# Patient Record
Sex: FEMALE | URBAN - METROPOLITAN AREA
[De-identification: names, ages, dates, MRNs, and addresses within clinical notes are randomized per-mention and may not be internally consistent; named-entity substitution may affect disease eponyms.]

---

## 2019-02-26 ENCOUNTER — EMERGENCY (EMERGENCY)
Facility: HOSPITAL | Age: 54
LOS: 1 days | Discharge: HOME | End: 2019-02-26
Attending: EMERGENCY MEDICINE | Admitting: EMERGENCY MEDICINE
Payer: MEDICAID

## 2019-02-26 VITALS
HEART RATE: 92 BPM | RESPIRATION RATE: 18 BRPM | OXYGEN SATURATION: 99 % | SYSTOLIC BLOOD PRESSURE: 139 MMHG | DIASTOLIC BLOOD PRESSURE: 94 MMHG | TEMPERATURE: 98 F

## 2019-02-26 LAB
ALBUMIN SERPL ELPH-MCNC: 4.7 G/DL — SIGNIFICANT CHANGE UP (ref 3.3–5)
ALP SERPL-CCNC: 43 U/L — SIGNIFICANT CHANGE UP (ref 40–120)
ALT FLD-CCNC: 32 U/L — SIGNIFICANT CHANGE UP (ref 4–33)
ANION GAP SERPL CALC-SCNC: 13 MMO/L — SIGNIFICANT CHANGE UP (ref 7–14)
AST SERPL-CCNC: 17 U/L — SIGNIFICANT CHANGE UP (ref 4–32)
BASOPHILS # BLD AUTO: 0.03 K/UL — SIGNIFICANT CHANGE UP (ref 0–0.2)
BASOPHILS NFR BLD AUTO: 0.6 % — SIGNIFICANT CHANGE UP (ref 0–2)
BILIRUB SERPL-MCNC: 0.3 MG/DL — SIGNIFICANT CHANGE UP (ref 0.2–1.2)
BUN SERPL-MCNC: 11 MG/DL — SIGNIFICANT CHANGE UP (ref 7–23)
CALCIUM SERPL-MCNC: 9.7 MG/DL — SIGNIFICANT CHANGE UP (ref 8.4–10.5)
CHLORIDE SERPL-SCNC: 102 MMOL/L — SIGNIFICANT CHANGE UP (ref 98–107)
CO2 SERPL-SCNC: 25 MMOL/L — SIGNIFICANT CHANGE UP (ref 22–31)
CREAT SERPL-MCNC: 0.52 MG/DL — SIGNIFICANT CHANGE UP (ref 0.5–1.3)
EOSINOPHIL # BLD AUTO: 0.04 K/UL — SIGNIFICANT CHANGE UP (ref 0–0.5)
EOSINOPHIL NFR BLD AUTO: 0.8 % — SIGNIFICANT CHANGE UP (ref 0–6)
GLUCOSE SERPL-MCNC: 223 MG/DL — HIGH (ref 70–99)
HBA1C BLD-MCNC: 7 % — HIGH (ref 4–5.6)
HCT VFR BLD CALC: 40.9 % — SIGNIFICANT CHANGE UP (ref 34.5–45)
HGB BLD-MCNC: 13.1 G/DL — SIGNIFICANT CHANGE UP (ref 11.5–15.5)
IMM GRANULOCYTES NFR BLD AUTO: 0.4 % — SIGNIFICANT CHANGE UP (ref 0–1.5)
LYMPHOCYTES # BLD AUTO: 1.87 K/UL — SIGNIFICANT CHANGE UP (ref 1–3.3)
LYMPHOCYTES # BLD AUTO: 35.3 % — SIGNIFICANT CHANGE UP (ref 13–44)
MAGNESIUM SERPL-MCNC: 1.9 MG/DL — SIGNIFICANT CHANGE UP (ref 1.6–2.6)
MCHC RBC-ENTMCNC: 27.8 PG — SIGNIFICANT CHANGE UP (ref 27–34)
MCHC RBC-ENTMCNC: 32 % — SIGNIFICANT CHANGE UP (ref 32–36)
MCV RBC AUTO: 86.7 FL — SIGNIFICANT CHANGE UP (ref 80–100)
MONOCYTES # BLD AUTO: 0.25 K/UL — SIGNIFICANT CHANGE UP (ref 0–0.9)
MONOCYTES NFR BLD AUTO: 4.7 % — SIGNIFICANT CHANGE UP (ref 2–14)
NEUTROPHILS # BLD AUTO: 3.08 K/UL — SIGNIFICANT CHANGE UP (ref 1.8–7.4)
NEUTROPHILS NFR BLD AUTO: 58.2 % — SIGNIFICANT CHANGE UP (ref 43–77)
NRBC # FLD: 0 K/UL — LOW (ref 25–125)
PHOSPHATE SERPL-MCNC: 2.8 MG/DL — SIGNIFICANT CHANGE UP (ref 2.5–4.5)
PLATELET # BLD AUTO: 118 K/UL — LOW (ref 150–400)
PMV BLD: SIGNIFICANT CHANGE UP FL (ref 7–13)
POTASSIUM SERPL-MCNC: 4.1 MMOL/L — SIGNIFICANT CHANGE UP (ref 3.5–5.3)
POTASSIUM SERPL-SCNC: 4.1 MMOL/L — SIGNIFICANT CHANGE UP (ref 3.5–5.3)
PROT SERPL-MCNC: 7.1 G/DL — SIGNIFICANT CHANGE UP (ref 6–8.3)
RBC # BLD: 4.72 M/UL — SIGNIFICANT CHANGE UP (ref 3.8–5.2)
RBC # FLD: 13.3 % — SIGNIFICANT CHANGE UP (ref 10.3–14.5)
SODIUM SERPL-SCNC: 140 MMOL/L — SIGNIFICANT CHANGE UP (ref 135–145)
WBC # BLD: 5.29 K/UL — SIGNIFICANT CHANGE UP (ref 3.8–10.5)
WBC # FLD AUTO: 5.29 K/UL — SIGNIFICANT CHANGE UP (ref 3.8–10.5)

## 2019-02-26 PROCEDURE — 99283 EMERGENCY DEPT VISIT LOW MDM: CPT

## 2019-02-26 RX ORDER — SODIUM CHLORIDE 9 MG/ML
1000 INJECTION INTRAMUSCULAR; INTRAVENOUS; SUBCUTANEOUS ONCE
Qty: 0 | Refills: 0 | Status: COMPLETED | OUTPATIENT
Start: 2019-02-26 | End: 2019-02-26

## 2019-02-26 RX ADMIN — SODIUM CHLORIDE 1000 MILLILITER(S): 9 INJECTION INTRAMUSCULAR; INTRAVENOUS; SUBCUTANEOUS at 11:27

## 2019-02-26 NOTE — ED PROVIDER NOTE - CRANIAL NERVE AND PUPILLARY EXAM
tongue is midline/central and peripheral vision intact/cranial nerves 2-12 intact/extra-ocular movements intact

## 2019-02-26 NOTE — ED ADULT NURSE NOTE - NSIMPLEMENTINTERV_GEN_ALL_ED
Implemented All Universal Safety Interventions:  [place] to call system. Call bell, personal items and telephone within reach. Instruct patient to call for assistance. Room bathroom lighting operational. Non-slip footwear when patient is off stretcher. Physically safe environment: no spills, clutter or unnecessary equipment. Stretcher in lowest position, wheels locked, appropriate side rails in place.

## 2019-02-26 NOTE — ED ADULT NURSE NOTE - OBJECTIVE STATEMENT
received pt to INT12, Patient has c/o being dizzy and feeling like she is having spasms in her head x 1week, pt A&Ox4, VSS, denies CP/SOB/N, IV access rt ac 20g, labs sent, 1L NS bolus initiated.

## 2019-02-26 NOTE — ED PROVIDER NOTE - NSFOLLOWUPINSTRUCTIONS_ED_ALL_ED_FT
- Follow up with your doctor within 2-3 days.   - Bring results with you to the appointment.   - Return to the ED for any new or worsening symptoms.

## 2019-02-26 NOTE — ED PROVIDER NOTE - ATTENDING CONTRIBUTION TO CARE
Attending Attestation: Dr. Villagran  I have personally performed a history and physical examination of the patient and discussed management with the resident as well as the patient.  I reviewed the resident's note and agree with the documented findings and plan of care.  I have authored and modified critical sections of the Provider Note, including but not limited to HPI, Physical Exam and MDM. 53 yr old F p/w paresthesias, poorly controlled blood glucose.  NO pain, neuro exam benign with no focal findings.  No skin changes/rash. Suspect hyperglycemic related sx.  Will send labs to assess eelctorlyte abn. Also, A1C.

## 2019-02-26 NOTE — ED PROVIDER NOTE - OBJECTIVE STATEMENT
53F h/o HTN, HLD, DM p/w waxing and waning numbness feeling radiating from forehead over her head x 1.5 weeks, worse x 3 days, sometimes a/w head pain, currently pain free. Saw PMD 4 days ago, told to stop taking additional supplements she recently started taking. Believes symptoms are worse when her sugar is high or low. Denies fever, travel, vision change, nausea, vomiting, photophobia. 53F h/o HTN, HLD, DM p/w waxing and waning "numbness" (tingling?) feeling radiating from forehead over her head x 1.5 weeks, worse x 3 days, sometimes a/w head pain, currently pain free. Saw PMD 4 days ago, told to stop taking additional supplements she recently started taking. Believes symptoms are worse when her sugar is high or low. Denies fever, travel, vision change, nausea, vomiting, photophobia.  No recent change to her chronic meds. States that recently her blood glucose has been poorly controlled - koo snot know why.  Once her sx started she was told to decrease her metformin from 100 bid to 500 bid.

## 2019-02-26 NOTE — ED PROVIDER NOTE - CLINICAL SUMMARY MEDICAL DECISION MAKING FREE TEXT BOX
53 yr old F p/w paresthesias, poorly controlled blood glucose.  NO pain, neuro exam benign with no focal findings.  No skin changes/rash. Suspect hyperglycemic related sx.  Will send labs to assess eelctorlyte abn. Also, A1C.

## 2021-04-13 PROBLEM — E11.9 TYPE 2 DIABETES MELLITUS WITHOUT COMPLICATIONS: Chronic | Status: ACTIVE | Noted: 2019-02-26

## 2021-04-13 PROBLEM — E78.5 HYPERLIPIDEMIA, UNSPECIFIED: Chronic | Status: ACTIVE | Noted: 2019-02-26

## 2021-04-13 PROBLEM — I10 ESSENTIAL (PRIMARY) HYPERTENSION: Chronic | Status: ACTIVE | Noted: 2019-02-26

## 2021-05-12 ENCOUNTER — APPOINTMENT (OUTPATIENT)
Dept: VASCULAR SURGERY | Facility: CLINIC | Age: 56
End: 2021-05-12

## 2021-09-01 ENCOUNTER — OUTPATIENT (OUTPATIENT)
Dept: OUTPATIENT SERVICES | Facility: HOSPITAL | Age: 56
LOS: 1 days | End: 2021-09-01
Payer: MEDICAID

## 2021-09-01 PROCEDURE — G9005: CPT

## 2021-09-21 ENCOUNTER — EMERGENCY (EMERGENCY)
Facility: HOSPITAL | Age: 56
LOS: 1 days | Discharge: HOME | End: 2021-09-21
Attending: STUDENT IN AN ORGANIZED HEALTH CARE EDUCATION/TRAINING PROGRAM | Admitting: STUDENT IN AN ORGANIZED HEALTH CARE EDUCATION/TRAINING PROGRAM

## 2021-09-21 VITALS
OXYGEN SATURATION: 100 % | SYSTOLIC BLOOD PRESSURE: 158 MMHG | DIASTOLIC BLOOD PRESSURE: 90 MMHG | HEART RATE: 117 BPM | TEMPERATURE: 98 F | RESPIRATION RATE: 18 BRPM

## 2021-09-21 VITALS
HEART RATE: 105 BPM | OXYGEN SATURATION: 100 % | RESPIRATION RATE: 20 BRPM | SYSTOLIC BLOOD PRESSURE: 135 MMHG | DIASTOLIC BLOOD PRESSURE: 76 MMHG

## 2021-09-21 RX ORDER — LIDOCAINE 4 G/100G
1 CREAM TOPICAL ONCE
Refills: 0 | Status: COMPLETED | OUTPATIENT
Start: 2021-09-21 | End: 2021-09-21

## 2021-09-21 RX ORDER — ACETAMINOPHEN 500 MG
650 TABLET ORAL ONCE
Refills: 0 | Status: COMPLETED | OUTPATIENT
Start: 2021-09-21 | End: 2021-09-21

## 2021-09-21 RX ORDER — BUPIVACAINE HCL/PF 7.5 MG/ML
10 VIAL (ML) INJECTION ONCE
Refills: 0 | Status: DISCONTINUED | OUTPATIENT
Start: 2021-09-21 | End: 2021-09-25

## 2021-09-21 RX ORDER — DEXAMETHASONE 0.5 MG/5ML
12 ELIXIR ORAL ONCE
Refills: 0 | Status: COMPLETED | OUTPATIENT
Start: 2021-09-21 | End: 2021-09-21

## 2021-09-21 RX ORDER — IBUPROFEN 200 MG
400 TABLET ORAL ONCE
Refills: 0 | Status: COMPLETED | OUTPATIENT
Start: 2021-09-21 | End: 2021-09-21

## 2021-09-21 RX ORDER — CYCLOBENZAPRINE HYDROCHLORIDE 10 MG/1
1 TABLET, FILM COATED ORAL
Qty: 15 | Refills: 0
Start: 2021-09-21 | End: 2021-09-25

## 2021-09-21 RX ORDER — DIAZEPAM 5 MG
5 TABLET ORAL ONCE
Refills: 0 | Status: DISCONTINUED | OUTPATIENT
Start: 2021-09-21 | End: 2021-09-21

## 2021-09-21 RX ADMIN — Medication 400 MILLIGRAM(S): at 13:43

## 2021-09-21 RX ADMIN — Medication 5 MILLIGRAM(S): at 13:43

## 2021-09-21 RX ADMIN — LIDOCAINE 1 PATCH: 4 CREAM TOPICAL at 13:43

## 2021-09-21 RX ADMIN — Medication 12 MILLIGRAM(S): at 15:11

## 2021-09-21 RX ADMIN — LIDOCAINE 1 PATCH: 4 CREAM TOPICAL at 13:57

## 2021-09-21 NOTE — ED PROVIDER NOTE - ATTENDING CONTRIBUTION TO CARE
I (Portia) agree with above, I performed a history and physical. Counseled [facility] medical staff, physician assistant, and/or medical student on medical decision making as documented. Medical decisions and treatment interventions were made in real time during the patient encounter. Additionally and/or with the following exceptions: the patient presented with lower back pain, radiating to right leg, has had an mri which was positive for herniated disc, no red flags ||| vital signs normal and stable during my period of care ||| neuro intact, ambulatory initially with antalgic gait, after  trigger point injections, pain dramatically improved, return precautions, ortho spine follow up

## 2021-09-21 NOTE — ED PROCEDURE NOTE - PROCEDURE ADDITIONAL DETAILS
The patient presented with trigger points in the right paraspinal lumbar muscle groups.  Injections of lidocaine 0.25 bupivicaine were injected into 3 area areas of each left and right side paraspinal muscles.  The patient tolerated the procedure well, reporting improvement of the pain. CPT=48344

## 2021-09-21 NOTE — ED ADULT NURSE NOTE - CHIEF COMPLAINT QUOTE
Pt reporting to ED for R lower back pain radiating to right calf starting 1 week ago. Pt ambulatorily with steady gait in triage. tachycardic on assessment. Denies chest pain ot SOB, dysuria, recent injury. awaiting EKG.

## 2021-09-21 NOTE — ED ADULT TRIAGE NOTE - CHIEF COMPLAINT QUOTE
Pt reporting to ED for R lower back pain radiating to right calf starting 1 week ago. Pt ambulatorily with steady gait in triage. tachycardic on assessment. Denies chest pain ot SOB. awaiting EKG. Pt reporting to ED for R lower back pain radiating to right calf starting 1 week ago. Pt ambulatorily with steady gait in triage. tachycardic on assessment. Denies chest pain ot SOB, dysuria, recent injury. awaiting EKG.

## 2021-09-21 NOTE — ED PROVIDER NOTE - CLINICAL SUMMARY MEDICAL DECISION MAKING FREE TEXT BOX
this is a 55 y.o female with a pMhx of DM presented to the ED complaining of having right sided lower back pain for the past several months but worsening over the past few days, back pain-meds, tigger point injection reassess.

## 2021-09-21 NOTE — ED PROVIDER NOTE - NS CPE EDP MUSC LUMBAR LOC
tenderness in the lumbar region, neurovascular intact. 5/5 strength. ambulating with steady gait./tenderness

## 2021-09-21 NOTE — ED PROVIDER NOTE - OBJECTIVE STATEMENT
this is a 55 y.o female with a pMhx of DM presented to the ED complaining of having right sided lower back pain for the past several months but worsening over the past few days, patient states that the pain starts in the right lower back region and radiates down, she gets a burning in her calf region as well, patient states that she has a MRI previous which showed that she had a bulging disc. patient admits to having a burning sensation started in the lower back region and radiates down. Patient denies having any recent travel, OCP usage, or h/o blood clots. pt denies having any CP, SOB, ESPINOZA, headaches, dizziness, fever, chills, bodyaches, saddle parasthesias, or bowel or bladder incontinence.

## 2021-09-21 NOTE — ED PROVIDER NOTE - NSFOLLOWUPINSTRUCTIONS_ED_ALL_ED_FT
Rest, drink plenty of fluids.  Advance activity as tolerated.  Continue all previously prescribed medications as directed.  Follow up with your primary care physician in 48-72 hours- bring copies of your results.  Return to the ER for worsening or persistent symptoms, and/or ANY NEW OR CONCERNING SYMPTOMS. If you have issues obtaining follow up, please call: [phone] to obtain a doctor or specialist who takes your insurance in your area.  You may call [phone] to make an appointment with the internal medicine clinic.    Please follow up with your PMD.

## 2021-09-21 NOTE — ED PROVIDER NOTE - PATIENT PORTAL LINK FT
You can access the FollowMyHealth Patient Portal offered by [facility] by registering at the following website: [email or web address]. By joining [employer]’s FollowMyHealth portal, you will also be able to view your health information using other applications (apps) compatible with our system.

## 2021-09-21 NOTE — ED ADULT NURSE NOTE - OBJECTIVE STATEMENT
Break RN: pt received to intake rm 9 c/o lower right back pain radiating down to R calf. AxOx4 and ambulatory at baseline. Pt appears uncomfortable, in NAD, respirations even/unlabored. PT denies any recent injury/fall. Pt medicated as per orders, will continue to monitor.

## 2021-09-22 ENCOUNTER — EMERGENCY (EMERGENCY)
Facility: HOSPITAL | Age: 56
LOS: 1 days | Discharge: HOME | End: 2021-09-22
Attending: EMERGENCY MEDICINE | Admitting: EMERGENCY MEDICINE
Payer: MEDICAID

## 2021-09-22 VITALS
TEMPERATURE: 98 F | HEART RATE: 100 BPM | DIASTOLIC BLOOD PRESSURE: 62 MMHG | OXYGEN SATURATION: 99 % | SYSTOLIC BLOOD PRESSURE: 140 MMHG | RESPIRATION RATE: 18 BRPM

## 2021-09-22 LAB
ALBUMIN SERPL ELPH-MCNC: 5.6 G/DL — HIGH (ref 3.3–5)
ALP SERPL-CCNC: 63 U/L — SIGNIFICANT CHANGE UP (ref 40–120)
ALT FLD-CCNC: 49 U/L — HIGH (ref 4–33)
ANION GAP SERPL CALC-SCNC: 17 MMOL/L — HIGH (ref 7–14)
AST SERPL-CCNC: 20 U/L — SIGNIFICANT CHANGE UP (ref 4–32)
B PERT DNA SPEC QL NAA+PROBE: SIGNIFICANT CHANGE UP
B PERT+PARAPERT DNA PNL SPEC NAA+PROBE: SIGNIFICANT CHANGE UP
BILIRUB SERPL-MCNC: 0.3 MG/DL — SIGNIFICANT CHANGE UP (ref 0.2–1.2)
BORDETELLA PARAPERTUSSIS (RAPRVP): SIGNIFICANT CHANGE UP
BUN SERPL-MCNC: 14 MG/DL — SIGNIFICANT CHANGE UP (ref 7–23)
C PNEUM DNA SPEC QL NAA+PROBE: SIGNIFICANT CHANGE UP
CALCIUM SERPL-MCNC: 10.4 MG/DL — SIGNIFICANT CHANGE UP (ref 8.4–10.5)
CHLORIDE SERPL-SCNC: 99 MMOL/L — SIGNIFICANT CHANGE UP (ref 98–107)
CO2 SERPL-SCNC: 21 MMOL/L — LOW (ref 22–31)
CREAT SERPL-MCNC: 0.44 MG/DL — LOW (ref 0.5–1.3)
FLUAV SUBTYP SPEC NAA+PROBE: SIGNIFICANT CHANGE UP
FLUBV RNA SPEC QL NAA+PROBE: SIGNIFICANT CHANGE UP
GLUCOSE SERPL-MCNC: 206 MG/DL — HIGH (ref 70–99)
HADV DNA SPEC QL NAA+PROBE: SIGNIFICANT CHANGE UP
HCOV 229E RNA SPEC QL NAA+PROBE: SIGNIFICANT CHANGE UP
HCOV HKU1 RNA SPEC QL NAA+PROBE: SIGNIFICANT CHANGE UP
HCOV NL63 RNA SPEC QL NAA+PROBE: SIGNIFICANT CHANGE UP
HCOV OC43 RNA SPEC QL NAA+PROBE: SIGNIFICANT CHANGE UP
HCT VFR BLD CALC: 40.6 % — SIGNIFICANT CHANGE UP (ref 34.5–45)
HGB BLD-MCNC: 13.2 G/DL — SIGNIFICANT CHANGE UP (ref 11.5–15.5)
HMPV RNA SPEC QL NAA+PROBE: SIGNIFICANT CHANGE UP
HPIV1 RNA SPEC QL NAA+PROBE: SIGNIFICANT CHANGE UP
HPIV2 RNA SPEC QL NAA+PROBE: SIGNIFICANT CHANGE UP
HPIV3 RNA SPEC QL NAA+PROBE: SIGNIFICANT CHANGE UP
HPIV4 RNA SPEC QL NAA+PROBE: SIGNIFICANT CHANGE UP
M PNEUMO DNA SPEC QL NAA+PROBE: SIGNIFICANT CHANGE UP
MCHC RBC-ENTMCNC: 27.3 PG — SIGNIFICANT CHANGE UP (ref 27–34)
MCHC RBC-ENTMCNC: 32.5 GM/DL — SIGNIFICANT CHANGE UP (ref 32–36)
MCV RBC AUTO: 84.1 FL — SIGNIFICANT CHANGE UP (ref 80–100)
NRBC # BLD: 0 /100 WBCS — SIGNIFICANT CHANGE UP
NRBC # FLD: 0.02 K/UL — HIGH
PLATELET # BLD AUTO: 148 K/UL — LOW (ref 150–400)
POTASSIUM SERPL-MCNC: 3.9 MMOL/L — SIGNIFICANT CHANGE UP (ref 3.5–5.3)
POTASSIUM SERPL-SCNC: 3.9 MMOL/L — SIGNIFICANT CHANGE UP (ref 3.5–5.3)
PROT SERPL-MCNC: 8.4 G/DL — HIGH (ref 6–8.3)
RAPID RVP RESULT: SIGNIFICANT CHANGE UP
RBC # BLD: 4.83 M/UL — SIGNIFICANT CHANGE UP (ref 3.8–5.2)
RBC # FLD: 13.9 % — SIGNIFICANT CHANGE UP (ref 10.3–14.5)
RSV RNA SPEC QL NAA+PROBE: SIGNIFICANT CHANGE UP
RV+EV RNA SPEC QL NAA+PROBE: SIGNIFICANT CHANGE UP
SARS-COV-2 RNA SPEC QL NAA+PROBE: SIGNIFICANT CHANGE UP
SODIUM SERPL-SCNC: 137 MMOL/L — SIGNIFICANT CHANGE UP (ref 135–145)
WBC # BLD: 9.58 K/UL — SIGNIFICANT CHANGE UP (ref 3.8–10.5)
WBC # FLD AUTO: 9.58 K/UL — SIGNIFICANT CHANGE UP (ref 3.8–10.5)

## 2021-09-22 PROCEDURE — 72100 X-RAY EXAM L-S SPINE 2/3 VWS: CPT | Mod: 26

## 2021-09-22 PROCEDURE — 93971 EXTREMITY STUDY: CPT | Mod: 26,LT

## 2021-09-22 PROCEDURE — 99218: CPT

## 2021-09-22 RX ORDER — MORPHINE SULFATE 50 MG/1
4 CAPSULE, EXTENDED RELEASE ORAL ONCE
Refills: 0 | Status: DISCONTINUED | OUTPATIENT
Start: 2021-09-22 | End: 2021-09-22

## 2021-09-22 RX ORDER — KETOROLAC TROMETHAMINE 30 MG/ML
30 SYRINGE (ML) INJECTION EVERY 6 HOURS
Refills: 0 | Status: COMPLETED | OUTPATIENT
Start: 2021-09-22 | End: 2021-09-27

## 2021-09-22 RX ORDER — INSULIN LISPRO 100/ML
VIAL (ML) SUBCUTANEOUS
Refills: 0 | Status: DISCONTINUED | OUTPATIENT
Start: 2021-09-22 | End: 2021-09-25

## 2021-09-22 RX ORDER — DEXTROSE 50 % IN WATER 50 %
12.5 SYRINGE (ML) INTRAVENOUS ONCE
Refills: 0 | Status: DISCONTINUED | OUTPATIENT
Start: 2021-09-22 | End: 2021-09-25

## 2021-09-22 RX ORDER — OXYCODONE HYDROCHLORIDE 5 MG/1
5 TABLET ORAL ONCE
Refills: 0 | Status: DISCONTINUED | OUTPATIENT
Start: 2021-09-22 | End: 2021-09-22

## 2021-09-22 RX ORDER — DEXTROSE 50 % IN WATER 50 %
15 SYRINGE (ML) INTRAVENOUS ONCE
Refills: 0 | Status: DISCONTINUED | OUTPATIENT
Start: 2021-09-22 | End: 2021-09-25

## 2021-09-22 RX ORDER — SODIUM CHLORIDE 9 MG/ML
1000 INJECTION, SOLUTION INTRAVENOUS
Refills: 0 | Status: DISCONTINUED | OUTPATIENT
Start: 2021-09-22 | End: 2021-09-25

## 2021-09-22 RX ORDER — ACETAMINOPHEN 500 MG
975 TABLET ORAL ONCE
Refills: 0 | Status: DISCONTINUED | OUTPATIENT
Start: 2021-09-22 | End: 2021-09-22

## 2021-09-22 RX ORDER — METFORMIN HYDROCHLORIDE 850 MG/1
1000 TABLET ORAL
Refills: 0 | Status: DISCONTINUED | OUTPATIENT
Start: 2021-09-22 | End: 2021-09-23

## 2021-09-22 RX ORDER — LISINOPRIL 2.5 MG/1
20 TABLET ORAL DAILY
Refills: 0 | Status: DISCONTINUED | OUTPATIENT
Start: 2021-09-23 | End: 2021-09-25

## 2021-09-22 RX ORDER — GLUCAGON INJECTION, SOLUTION 0.5 MG/.1ML
1 INJECTION, SOLUTION SUBCUTANEOUS ONCE
Refills: 0 | Status: DISCONTINUED | OUTPATIENT
Start: 2021-09-22 | End: 2021-09-25

## 2021-09-22 RX ORDER — GABAPENTIN 400 MG/1
300 CAPSULE ORAL ONCE
Refills: 0 | Status: COMPLETED | OUTPATIENT
Start: 2021-09-22 | End: 2021-09-22

## 2021-09-22 RX ORDER — CYCLOBENZAPRINE HYDROCHLORIDE 10 MG/1
5 TABLET, FILM COATED ORAL ONCE
Refills: 0 | Status: COMPLETED | OUTPATIENT
Start: 2021-09-22 | End: 2021-09-22

## 2021-09-22 RX ORDER — ACETAMINOPHEN 500 MG
650 TABLET ORAL ONCE
Refills: 0 | Status: COMPLETED | OUTPATIENT
Start: 2021-09-22 | End: 2021-09-22

## 2021-09-22 RX ORDER — OXYCODONE AND ACETAMINOPHEN 5; 325 MG/1; MG/1
1 TABLET ORAL ONCE
Refills: 0 | Status: DISCONTINUED | OUTPATIENT
Start: 2021-09-22 | End: 2021-09-22

## 2021-09-22 RX ORDER — ATORVASTATIN CALCIUM 80 MG/1
10 TABLET, FILM COATED ORAL AT BEDTIME
Refills: 0 | Status: DISCONTINUED | OUTPATIENT
Start: 2021-09-22 | End: 2021-09-25

## 2021-09-22 RX ORDER — DEXTROSE 50 % IN WATER 50 %
25 SYRINGE (ML) INTRAVENOUS ONCE
Refills: 0 | Status: DISCONTINUED | OUTPATIENT
Start: 2021-09-22 | End: 2021-09-25

## 2021-09-22 RX ORDER — ACETAMINOPHEN 500 MG
650 TABLET ORAL EVERY 6 HOURS
Refills: 0 | Status: DISCONTINUED | OUTPATIENT
Start: 2021-09-22 | End: 2021-09-25

## 2021-09-22 RX ORDER — DIAZEPAM 5 MG
5 TABLET ORAL EVERY 8 HOURS
Refills: 0 | Status: COMPLETED | OUTPATIENT
Start: 2021-09-22 | End: 2021-09-29

## 2021-09-22 RX ORDER — LIDOCAINE 4 G/100G
1 CREAM TOPICAL DAILY
Refills: 0 | Status: DISCONTINUED | OUTPATIENT
Start: 2021-09-22 | End: 2021-09-25

## 2021-09-22 RX ADMIN — OXYCODONE AND ACETAMINOPHEN 1 TABLET(S): 5; 325 TABLET ORAL at 12:53

## 2021-09-22 RX ADMIN — Medication 650 MILLIGRAM(S): at 17:48

## 2021-09-22 RX ADMIN — Medication 5 MILLIGRAM(S): at 15:35

## 2021-09-22 RX ADMIN — OXYCODONE AND ACETAMINOPHEN 1 TABLET(S): 5; 325 TABLET ORAL at 09:34

## 2021-09-22 RX ADMIN — METFORMIN HYDROCHLORIDE 1000 MILLIGRAM(S): 850 TABLET ORAL at 17:49

## 2021-09-22 RX ADMIN — Medication 500 MILLIGRAM(S): at 09:26

## 2021-09-22 RX ADMIN — GABAPENTIN 300 MILLIGRAM(S): 400 CAPSULE ORAL at 09:31

## 2021-09-22 RX ADMIN — Medication 60 MILLIGRAM(S): at 09:35

## 2021-09-22 RX ADMIN — MORPHINE SULFATE 4 MILLIGRAM(S): 50 CAPSULE, EXTENDED RELEASE ORAL at 12:58

## 2021-09-22 RX ADMIN — OXYCODONE HYDROCHLORIDE 5 MILLIGRAM(S): 5 TABLET ORAL at 21:14

## 2021-09-22 RX ADMIN — ATORVASTATIN CALCIUM 10 MILLIGRAM(S): 80 TABLET, FILM COATED ORAL at 21:14

## 2021-09-22 RX ADMIN — Medication 650 MILLIGRAM(S): at 12:53

## 2021-09-22 RX ADMIN — Medication 650 MILLIGRAM(S): at 09:35

## 2021-09-22 RX ADMIN — Medication 500 MILLIGRAM(S): at 12:53

## 2021-09-22 RX ADMIN — CYCLOBENZAPRINE HYDROCHLORIDE 5 MILLIGRAM(S): 10 TABLET, FILM COATED ORAL at 09:26

## 2021-09-22 RX ADMIN — Medication 30 MILLIGRAM(S): at 17:48

## 2021-09-22 NOTE — ED CDU PROVIDER INITIAL DAY NOTE - ATTENDING CONTRIBUTION TO CARE
I performed a face-to-face evaluation of the patient and performed a history and physical examination. I agree with the history and physical examination.    Andreea: Sciatica manifesting as posterior calf pain. No neuro deficits or bowel/bladder incontinence. No cancer/fever/IVDA/trauma. Therefore, not likely epidural abscess, traumatic fracture, osteomyelitis, or spinal cord compression. Failed PO and IV pain meds. CDU for pain control. Pt. not interested in surgery; therefore, no likely benefit of MRI. May give prednisone taper: 60 mg for 2 more days, 40 mg for 3 days, and 20 mg for 3 days (https://pubmed.ncbi.nlm.nih.gov/62492012/). F/u [facility] [place] when her [employer] insurance re-starts 10/1/21.

## 2021-09-22 NOTE — ED ADULT TRIAGE NOTE - CHIEF COMPLAINT QUOTE
p/t c/o of rt hip pain radiating to rt calf for few days, p/t seen in ED yesterday for same issue, p/t ambulatory

## 2021-09-22 NOTE — ED CDU PROVIDER INITIAL DAY NOTE - OBJECTIVE STATEMENT
Andreea: H/o back pain; MRI w/ disc bulge last year. Improved w/ Francine Natarajan Neurologist-recommended PT. Yesterday, had lumbar pain; here, got trigger point injections. Today, burning pain R calf (not numb, weak), 9/10. No weakness/numbness. No neuro deficits or bowel/bladder incontinence. No cancer/fever/IVDA/trauma. Therefore, not likely epidural abscess, traumatic fracture, osteomyelitis, or spinal cord compression. Didn't take her yesterday-precribed Flexeril or Naproxen today. Pt with a H/o back pain, DM, HTN, HLD MRI w/ disc bulge last year. Improved w/ Francine Natarajan Neurologist-recommended PT. Yesterday, had lumbar pain; here, got trigger point injections. Today, burning pain R calf (not numb, weak), 9/10. No weakness/numbness. No neuro deficits or bowel/bladder incontinence. No cancer/fever/IVDA/trauma. Therefore, not likely epidural abscess, traumatic fracture, osteomyelitis, or spinal cord compression. Didn't take her yesterday-prescribed Flexeril or Naproxen today. Pt states that her pain is currently 8/10. Pt was sent to CDU for pain control and reassessment. Pt is well appearing, no acute distress.

## 2021-09-22 NOTE — ED PROVIDER NOTE - OBJECTIVE STATEMENT
Andreea: H/o back pain; MRI w/ disc bulge. Yesterday, had lumbar pain; here, got trigger point injections. Today, burning pain R calf (not numb, weak), 9/10. No weakness/numbness. No neuro deficits or bowel/bladder incontinence. No cancer/fever/IVDA/trauma. Therefore, not likely epidural abscess, traumatic fracture, osteomyelitis, or spinal cord compression. Didn't take her yesterday-precribed Flexeril or Naproxen today.

## 2021-09-22 NOTE — ED CDU PROVIDER INITIAL DAY NOTE - PHYSICAL EXAMINATION
Well appearing, well nourished, awake, alert, oriented to person, place, time/situation and in no apparent distress.    Airway patent    Eyes without scleral injection. No jaundice.    Strong pulse.    Respirations unlabored.    Abdomen soft, non-tender, no guarding.    Spine appears normal, range of motion is not limited, no muscle or joint tenderness. R calf not red/warm/tender/swollen.    Alert and oriented, no gross motor or sensory deficits.    Skin normal color for race, warm, dry and intact. No evidence of rash.    No SI/HI.

## 2021-09-22 NOTE — ED PROVIDER NOTE - PROGRESS NOTE DETAILS
Andreea: No improvement after PO meds. Check labs, xray, US for DVT. Possible CDU for MRI/pain meds. Andreea: Lab results unremarkable. Radiology results unremarkable. Pt. not feel well enough to go home. CDU for pain control.

## 2021-09-22 NOTE — ED CDU PROVIDER INITIAL DAY NOTE - MEDICAL DECISION MAKING DETAILS
Andreea: Sciatica manifesting as posterior calf pain. No neuro deficits or bowel/bladder incontinence. No cancer/fever/IVDA/trauma. Therefore, not likely epidural abscess, traumatic fracture, osteomyelitis, or spinal cord compression. Failed PO and IV pain meds. CDU for pain control. Pt. not interested in surgery; therefore, no likely benefit of MRI. May give prednisone taper: 60 mg for 2 more days, 40 mg for 3 days, and 20 mg for 3 days (https://pubmed.ncbi.nlm.nih.gov/68665979/). F/u [facility] when her [employer] insurance re-starts 10/1/21.

## 2021-09-22 NOTE — ED PROVIDER NOTE - NSFOLLOWUPINSTRUCTIONS_ED_ALL_ED_FT
Take medications as prescribed.     If not improved in 2 weeks, follow with [facility] [place] (comprehensive, with Neurology, Orthopedic Surgery, Neurosurgery, and Physical Therapy): [phone].

## 2021-09-22 NOTE — ED ADULT NURSE NOTE - OBJECTIVE STATEMENT
p/t c/o of rt hip pain radiating to rt calf x 3days, p/t seen in ED yesterday for same issue, p/t ambulatory. Medicated as per order, instructed patient to use call bell after pain meds, call bell in reach.

## 2021-09-22 NOTE — ED PROVIDER NOTE - CLINICAL SUMMARY MEDICAL DECISION MAKING FREE TEXT BOX
Andreea: Sciatica. No neuro deficits or bowel/bladder incontinence. No cancer/fever/IVDA/trauma. Therefore, not likely epidural abscess, traumatic fracture, osteomyelitis, or spinal cord compression. Give pain meds. F/u [facility] [place].

## 2021-09-22 NOTE — ED PROVIDER NOTE - PHYSICAL EXAMINATION
Well appearing, well nourished, awake, alert, oriented to person, place, time/situation and in no apparent distress.    Airway patent    Eyes without scleral injection. No jaundice.    Strong pulse.    Respirations unlabored.    Abdomen soft, non-tender, no guarding.    Spine appears normal, range of motion is not limited, no muscle or joint tenderness.    Alert and oriented, no gross motor or sensory deficits. 5/5 strength (B) LE.    Skin normal color for race, warm, dry and intact. No evidence of rash.    No SI/HI.

## 2021-09-23 DIAGNOSIS — M51.26 OTHER INTERVERTEBRAL DISC DISPLACEMENT, LUMBAR REGION: ICD-10-CM

## 2021-09-23 PROCEDURE — 72148 MRI LUMBAR SPINE W/O DYE: CPT | Mod: 26

## 2021-09-23 PROCEDURE — 99226: CPT

## 2021-09-23 PROCEDURE — 99282 EMERGENCY DEPT VISIT SF MDM: CPT

## 2021-09-23 RX ORDER — OXYCODONE AND ACETAMINOPHEN 5; 325 MG/1; MG/1
1 TABLET ORAL ONCE
Refills: 0 | Status: DISCONTINUED | OUTPATIENT
Start: 2021-09-23 | End: 2021-09-23

## 2021-09-23 RX ADMIN — Medication 30 MILLIGRAM(S): at 23:52

## 2021-09-23 RX ADMIN — Medication 650 MILLIGRAM(S): at 05:57

## 2021-09-23 RX ADMIN — Medication 5 MILLIGRAM(S): at 12:42

## 2021-09-23 RX ADMIN — ATORVASTATIN CALCIUM 10 MILLIGRAM(S): 80 TABLET, FILM COATED ORAL at 23:37

## 2021-09-23 RX ADMIN — Medication 5 MILLIGRAM(S): at 18:27

## 2021-09-23 RX ADMIN — LIDOCAINE 1 PATCH: 4 CREAM TOPICAL at 18:31

## 2021-09-23 RX ADMIN — Medication 5 MILLIGRAM(S): at 05:57

## 2021-09-23 RX ADMIN — Medication 30 MILLIGRAM(S): at 18:27

## 2021-09-23 RX ADMIN — Medication 30 MILLIGRAM(S): at 12:43

## 2021-09-23 RX ADMIN — Medication 650 MILLIGRAM(S): at 13:12

## 2021-09-23 RX ADMIN — LIDOCAINE 1 PATCH: 4 CREAM TOPICAL at 12:43

## 2021-09-23 RX ADMIN — Medication 650 MILLIGRAM(S): at 00:09

## 2021-09-23 RX ADMIN — Medication 650 MILLIGRAM(S): at 18:27

## 2021-09-23 RX ADMIN — Medication 650 MILLIGRAM(S): at 12:42

## 2021-09-23 RX ADMIN — OXYCODONE AND ACETAMINOPHEN 1 TABLET(S): 5; 325 TABLET ORAL at 16:52

## 2021-09-23 RX ADMIN — Medication 30 MILLIGRAM(S): at 13:13

## 2021-09-23 RX ADMIN — Medication 30 MILLIGRAM(S): at 23:37

## 2021-09-23 RX ADMIN — Medication 30 MILLIGRAM(S): at 05:57

## 2021-09-23 RX ADMIN — Medication 30 MILLIGRAM(S): at 00:09

## 2021-09-23 RX ADMIN — LISINOPRIL 20 MILLIGRAM(S): 2.5 TABLET ORAL at 05:57

## 2021-09-23 RX ADMIN — Medication 650 MILLIGRAM(S): at 23:37

## 2021-09-23 RX ADMIN — OXYCODONE AND ACETAMINOPHEN 1 TABLET(S): 5; 325 TABLET ORAL at 16:22

## 2021-09-23 NOTE — ED CDU PROVIDER SUBSEQUENT DAY NOTE - PROGRESS NOTE DETAILS
Pt reassessed, states she still has pain, no neuro deficits. Plan for MRI lumbar spine. ANABELLE Harrell: pt resting comfortably NAD, pt states pain improved.  MRI showing "Disc bulge with superimposed right subarticular zone disc protrusion at L5-S1, which likely contacts the exiting right L5 nerve root." Spine paged for consult.  Pt advised of MRI findings.

## 2021-09-23 NOTE — CONSULT NOTE ADULT - SUBJECTIVE AND OBJECTIVE BOX
Pt with a H/o back pain, DM, HTN, HLD MRI w/ disc bulge last year. Improved w/ Francine Natarajan Neurologist-recommended PT. 9/21 presented to ED with lumbar pain radiating to her right leg; here, got trigger point injections and was discharged. Yesterday she returned to ED with back pain and burning pain R calf (not numb, weak), 9/10. No weakness/numbness bowel or bladder dysfunction.    WDWN female in NAD  Vital Signs Last 24 Hrs  T(C): 37.1 (23 Sep 2021 21:55), Max: 37.1 (23 Sep 2021 10:22)  T(F): 98.7 (23 Sep 2021 21:55), Max: 98.8 (23 Sep 2021 10:22)  HR: 75 (23 Sep 2021 21:55) (65 - 85)  BP: 115/66 (23 Sep 2021 21:55) (106/66 - 142/87)  BP(mean): --  RR: 16 (23 Sep 2021 21:55) (15 - 18)  SpO2: 100% (23 Sep 2021 21:55) (98% - 100%)    AAO X 3  PERRLA, EOMI  BRADEN strength 5/5  SILT  No clonus    < from: MR Lumbar Spine No Cont (09.23.21 @ 17:41) >  ALIGNMENT: Straightening of the expected lumbar lordosis, without listhesis. Note, there is transitional anatomy with a lumbarized S1 vertebral body.  There is developmentally narrow spinal canal due to congenitally short pedicles.    VERTEBRAE: The vertebral bodies are normal in height. There is no fracture or aggressive osseous lesion.    DISCS: There is degenerative disc desiccation and loss of the vertebral disc space height at L5-S1.    CONUS MEDULLARIS AND CAUDA EQUINA: The conus medullaris terminates at L1-L2. There is normal appearance of the conus medullaris and cauda equina nerve roots.    PARAVERTEBRAL SOFT TISSUES AND VISUALIZED RETROPERITONEUM: The visualized paravertebral soft tissues appear within normal limits.    EVALUATION OF INDIVIDUAL LEVELS:  L1-2: No spinal canal or neuroforaminal stenosis.    L2-3: No spinal canal or neuroforaminal stenosis.    L3-4: Mild disc bulge. No spinal canal or neuroforaminal stenosis.    L4-5: Disc bulge and degenerative changes of the facet joints resulting in minimal left neuroforaminal narrowing. No canal stenosis or neuroforaminal narrowing on the right.    L5-S1: Disc bulge with superimposed right subarticular zone disc protrusion which likely contacts the exiting right L5 nerve roots, and degenerative changes of the facet joints resulting in mild canal stenosis.    LIMITED EVALUATION OF UPPER SACRUM AND SACROILIAC JOINTS: Unremarkable.    IMPRESSION:    Disc bulge with superimposed right subarticular zone disc protrusion at L5-S1, which likely contacts the exiting right L5 nerve root.    Correlation with symptoms and clinical exam findings is advised.    < end of copied text >

## 2021-09-23 NOTE — CONSULT NOTE ADULT - PROBLEM SELECTOR RECOMMENDATION 9
Trial of outpatient conservative management  Medrol taper over 5 days  Muscle relaxers and analgesics  Follow up with Dr Leger in 1 week

## 2021-09-23 NOTE — ED CDU PROVIDER SUBSEQUENT DAY NOTE - ATTENDING CONTRIBUTION TO CARE
I performed a face-to-face evaluation of the patient and performed a history and physical examination. I agree with the history and physical examination.    R calf pain likely 2/2 known lumbar disc herniation causing sciatica. Pain improved last night. Pt. stated she'd prefer not to have surgery. Had good response last year to Neuro. and PT. Will re-assess and dc w/ referral to [facility]. I performed a face-to-face evaluation of the patient and performed a history and physical examination. I agree with the history and physical examination.    R calf pain likely 2/2 known lumbar disc herniation causing sciatica. Pain not much improved last night (was 9/10, now 8.5/10; able to walk). Had good response last year to Neuro. and PT. But pt. prefers to have MRI to assess if there's been progression of disc herniation. Will do MRI.

## 2021-09-23 NOTE — CONSULT NOTE ADULT - ASSESSMENT
56 YO female with back pain radiating to her right leg found to have L5-S1 disc herniation possibly contacting the right L5 nerve root. Patient does not require emergent surgical intervention and is amenable to a brief course of outpatient conservative management

## 2021-09-24 VITALS
HEART RATE: 84 BPM | RESPIRATION RATE: 18 BRPM | SYSTOLIC BLOOD PRESSURE: 117 MMHG | OXYGEN SATURATION: 100 % | TEMPERATURE: 98 F | DIASTOLIC BLOOD PRESSURE: 69 MMHG

## 2021-09-24 PROCEDURE — 99217: CPT

## 2021-09-24 RX ORDER — OXYCODONE AND ACETAMINOPHEN 5; 325 MG/1; MG/1
1 TABLET ORAL ONCE
Refills: 0 | Status: DISCONTINUED | OUTPATIENT
Start: 2021-09-24 | End: 2021-09-24

## 2021-09-24 RX ORDER — OXYCODONE HYDROCHLORIDE 5 MG/1
5 TABLET ORAL ONCE
Refills: 0 | Status: DISCONTINUED | OUTPATIENT
Start: 2021-09-24 | End: 2021-09-24

## 2021-09-24 RX ADMIN — Medication 30 MILLIGRAM(S): at 07:55

## 2021-09-24 RX ADMIN — OXYCODONE HYDROCHLORIDE 5 MILLIGRAM(S): 5 TABLET ORAL at 04:47

## 2021-09-24 RX ADMIN — OXYCODONE HYDROCHLORIDE 5 MILLIGRAM(S): 5 TABLET ORAL at 05:47

## 2021-09-24 RX ADMIN — Medication 5 MILLIGRAM(S): at 02:46

## 2021-09-24 RX ADMIN — OXYCODONE AND ACETAMINOPHEN 1 TABLET(S): 5; 325 TABLET ORAL at 10:24

## 2021-09-24 RX ADMIN — Medication 650 MILLIGRAM(S): at 07:54

## 2021-09-24 RX ADMIN — Medication 650 MILLIGRAM(S): at 00:37

## 2021-09-24 NOTE — ED CDU PROVIDER DISPOSITION NOTE - ATTENDING CONTRIBUTION TO CARE
agree with PA note  my subsequent day note    ""Pt with a H/o back pain, DM, HTN, HLD MRI w/ disc bulge last year. Improved w/ Francine Natarajan Neurologist-recommended PT. Yesterday, had lumbar pain; here, got trigger point injections. Today, burning pain R calf (not numb, weak), 9/10. No weakness/numbness. No neuro deficits or bowel/bladder incontinence. No cancer/fever/IVDA/trauma. Therefore, not likely epidural abscess, traumatic fracture, osteomyelitis, or spinal cord compression. Didn't take her yesterday-prescribed Flexeril or Naproxen today. Pt states that her pain is currently 8/10. Pt was sent to CDU for pain control and reassessment. Pt is well appearing, no acute distress."    pt seen by NSG and MRI reviewed with possible L5 nerve root impingement, no weakness or numbness in calf.  Pt states still in pain but able to ambulate.  Has O/P with spine on wednesday.  Will give another round of pain meds and d/c on same.  pt understanding and acceptable of plan    PE: well appearing; ambulating; VSS: CTAB/L; s1 s2 no m/r/g abd soft/NT/ND ext: no edema right calf pain neuro: 5/5 motor UE and LE; sensation intact; ambulating.  "    NSG medrol dose pack, msucle relaxants and f/u with Dr. Leger in 1 week

## 2021-09-24 NOTE — ED CDU PROVIDER DISPOSITION NOTE - CLINICAL COURSE
this is a 55 y.o female with a PMHx of DM, HTN, HLD, presented to the ED complaining of having back left side back pain, which hasn't been improving thus coming to the ED. pt received several rounds of medications, which improved mildly . Patient was placed in CDU for MRI and pain control. patient states that the pain has improved since coming to the ED. Patient currently denies having any CP, SOB, ESPINOZA, headaches, dizziness, abdominal pain. Patient was also evaluated by neurosurgery whom recommended medrol taper, muscle relaxers and pain medication. patient also recommended to follow up with Dr. Leger in 1 week.

## 2021-09-24 NOTE — ED CDU PROVIDER DISPOSITION NOTE - NSFOLLOWUPINSTRUCTIONS_ED_ALL_ED_FT
Rest, drink plenty of fluids.  Advance activity as tolerated.  Continue all previously prescribed medications as directed.  Follow up with your primary care physician in 48-72 hours- bring copies of your results.  Return to the ER for worsening or persistent symptoms, and/or ANY NEW OR CONCERNING SYMPTOMS. If you have issues obtaining follow up, please call: [phone] to obtain a doctor or specialist who takes your insurance in your area.  You may call [phone] to make an appointment with the internal medicine clinic.     Please follow up with the neurosurgery team, and a spine specialist. Please return if symptoms worsen. Rest, drink plenty of fluids.  Advance activity as tolerated.  Continue all previously prescribed medications as directed.  Follow up with your primary care physician in 48-72 hours- bring copies of your results.  Return to the ER for worsening or persistent symptoms, and/or ANY NEW OR CONCERNING SYMPTOMS. If you have issues obtaining follow up, please call: [phone] to obtain a doctor or specialist who takes your insurance in your area.  You may call [phone] to make an appointment with the internal medicine clinic.     Please follow up with the neurosurgery team, and a spine specialist. Please return if symptoms worsen.    Please Make appointment with Dr. Leger to be seen in 1 week.

## 2021-09-24 NOTE — ED CDU PROVIDER DISPOSITION NOTE - CARE PROVIDER_API CALL
Brad Leger)  Neurosurgery  General  [address] [place] [address], [address]  [place] [address]  Phone: [phone]  Fax: [phone]  Follow Up Time:

## 2021-09-24 NOTE — ED CDU PROVIDER SUBSEQUENT DAY NOTE - NSICDXPASTMEDICALHX_GEN_ALL_CORE_FT
PAST MEDICAL HISTORY:  DM (diabetes mellitus)     HLD (hyperlipidemia)     HTN (hypertension)     
PAST MEDICAL HISTORY:  DM (diabetes mellitus)     HLD (hyperlipidemia)     HTN (hypertension)

## 2021-09-24 NOTE — ED CDU PROVIDER DISPOSITION NOTE - PATIENT PORTAL LINK FT
You can access the FollowMyHealth Patient Portal offered by [facility] by registering at the following website: [email or web address]. By joining "[employer]’s FollowMyHealth portal, you will also be able to view your health information using other applications (apps) compatible with our system.

## 2021-09-24 NOTE — ED CDU PROVIDER DISPOSITION NOTE - CARE PROVIDERS DIRECT ADDRESSES
[email or web address]

## 2021-09-24 NOTE — ED CDU PROVIDER SUBSEQUENT DAY NOTE - PHYSICAL EXAMINATION
Vital signs reviewed.   CONSTITUTIONAL: Well-appearing; well-nourished; in no apparent distress. Non-toxic appearing.   HEAD: Normocephalic, atraumatic.  RESP: NAD  EXT/MS: moves all extremities  SKIN: Normal for age and race
Back: +TTP right sided paraspinal lumbar region, no midline TTP, +right SLR, strength 5/5, sensation intact.

## 2021-09-24 NOTE — ED CDU PROVIDER SUBSEQUENT DAY NOTE - HISTORY
Pt is a 55 y.o female with PMHx of, DM, HTN, HLD MRI w/ disc bulge last year who presented to ED c/o back pain. Pt seen in ED other day and was given trigger point injection with good relief but than symptoms returned. While in ED patient had xray lumbar +for herniated disc, labs that are stable and LE US negative for DVT. Pt continued to have pain despite meds given in ER. Pt was sent to CDU for pain control and reassessment.     In interim- Patient resting comfortably. Noted some pain earlier in night that was relieved s/p oxycodone 5 mg. No acute events overnight. Vitals are stable. Will continue to monitor pain.
ANABELLE Harrell: pt sleeping comfortably in bed NAD, pt seen and evaluated by Spine Neurosurgery advised to discharge home on Pain medication, steroids, follow up in the office outpatient.  Will continue to monitor.

## 2021-09-24 NOTE — ED CDU PROVIDER SUBSEQUENT DAY NOTE - PROGRESS NOTE DETAILS
ANABELLE Harrell: pt lying comfortably in bed NAD, pt reports pain requesting pain medication.  Oxycodone 5mg x 1 dose ordered.  Will continue to monitor. ANABELLE Bowser: patient currently still in pain, however ambulating with steady gait, no neurologist deficits,  requesting pain medications. Pt was already evaluated by neurosurgery, whom recommending outpatient follow up and d/c on decadron. patient states that the pain has improved since coming to the ED.

## 2021-09-24 NOTE — ED CDU PROVIDER SUBSEQUENT DAY NOTE - MEDICAL DECISION MAKING DETAILS
Pt is a 55 y.o female with PMHx of, DM, HTN, HLD MRI w/ disc bulge last year who presented to ED c/o back pain. Pt seen in ED other day and was given trigger point injection with good relief but than symptoms returned. While in ED patient had xray lumbar +for herniated disc, labs that are stable and LE US negative for DVT. Pt continued to have pain despite meds given in ER. Pt was sent to CDU for pain control and reassessment.     In interim- Patient resting comfortably. Noted some pain earlier in night that was relieved s/p oxycodone 5 mg. No acute events overnight. Vitals are stable. Will continue to monitor pain.
54 y/o female with a hx of DM, HTN, HLD presented to the ER c/o right sided back pain radiating down right leg.  Pt placed in CDU for pain control.  Pt obtained MRI, spine called for consult advised pain control, steroids, follow up outpatient.  Will continue to monitor.

## 2021-09-24 NOTE — ED CDU PROVIDER SUBSEQUENT DAY NOTE - ATTENDING CONTRIBUTION TO CARE
agree with PA note    "Pt with a H/o back pain, DM, HTN, HLD MRI w/ disc bulge last year. Improved w/ Francine Natarajan Neurologist-recommended PT. Yesterday, had lumbar pain; here, got trigger point injections. Today, burning pain R calf (not numb, weak), 9/10. No weakness/numbness. No neuro deficits or bowel/bladder incontinence. No cancer/fever/IVDA/trauma. Therefore, not likely epidural abscess, traumatic fracture, osteomyelitis, or spinal cord compression. Didn't take her yesterday-prescribed Flexeril or Naproxen today. Pt states that her pain is currently 8/10. Pt was sent to CDU for pain control and reassessment. Pt is well appearing, no acute distress."    pt seen by NSG and MRI reviewed with possible L5 nerve root impingement, no weakness or numbness in calf.  Pt states still in pain but able to ambulate.  Has O/P with spine on wednesday.  Will give another round of pain meds and d/c on same.  pt understanding and acceptable of plan    PE: well appearing; ambulating; VSS: CTAB/L; s1 s2 no m/r/g abd soft/NT/ND ext: no edema right calf pain neuro: 5/5 motor UE and LE; sensation intact; ambulating

## 2021-09-27 PROBLEM — Z00.00 ENCOUNTER FOR PREVENTIVE HEALTH EXAMINATION: Status: ACTIVE | Noted: 2021-09-27

## 2021-09-29 ENCOUNTER — OUTPATIENT (OUTPATIENT)
Dept: OUTPATIENT SERVICES | Facility: HOSPITAL | Age: 56
LOS: 1 days | End: 2021-09-29

## 2021-09-29 ENCOUNTER — APPOINTMENT (OUTPATIENT)
Dept: ORTHOPEDIC SURGERY | Facility: HOSPITAL | Age: 56
End: 2021-09-29

## 2021-09-29 VITALS
HEIGHT: 60 IN | SYSTOLIC BLOOD PRESSURE: 131 MMHG | WEIGHT: 106 LBS | HEART RATE: 103 BPM | DIASTOLIC BLOOD PRESSURE: 79 MMHG | TEMPERATURE: 97.5 F | BODY MASS INDEX: 20.81 KG/M2

## 2021-09-29 DIAGNOSIS — M54.16 RADICULOPATHY, LUMBAR REGION: ICD-10-CM

## 2021-10-04 DIAGNOSIS — Z71.89 OTHER SPECIFIED COUNSELING: ICD-10-CM

## 2021-10-05 ENCOUNTER — NON-APPOINTMENT (OUTPATIENT)
Age: 56
End: 2021-10-05

## 2021-10-06 ENCOUNTER — APPOINTMENT (OUTPATIENT)
Dept: ORTHOPEDIC SURGERY | Facility: CLINIC | Age: 56
End: 2021-10-06
Payer: MEDICAID

## 2021-10-06 ENCOUNTER — NON-APPOINTMENT (OUTPATIENT)
Age: 56
End: 2021-10-06

## 2021-10-06 VITALS
BODY MASS INDEX: 20.01 KG/M2 | HEART RATE: 89 BPM | OXYGEN SATURATION: 99 % | SYSTOLIC BLOOD PRESSURE: 130 MMHG | DIASTOLIC BLOOD PRESSURE: 86 MMHG | HEIGHT: 61 IN | WEIGHT: 106 LBS

## 2021-10-06 DIAGNOSIS — Z78.9 OTHER SPECIFIED HEALTH STATUS: ICD-10-CM

## 2021-10-06 DIAGNOSIS — M54.16 RADICULOPATHY, LUMBAR REGION: ICD-10-CM

## 2021-10-06 DIAGNOSIS — Z86.39 PERSONAL HISTORY OF OTHER ENDOCRINE, NUTRITIONAL AND METABOLIC DISEASE: ICD-10-CM

## 2021-10-06 PROCEDURE — 72110 X-RAY EXAM L-2 SPINE 4/>VWS: CPT

## 2021-10-06 PROCEDURE — 99215 OFFICE O/P EST HI 40 MIN: CPT

## 2021-10-06 RX ORDER — GLIMEPIRIDE 4 MG/1
TABLET ORAL
Refills: 0 | Status: ACTIVE | COMMUNITY

## 2021-10-06 RX ORDER — RAMIPRIL 5 MG/1
CAPSULE ORAL
Refills: 0 | Status: ACTIVE | COMMUNITY

## 2021-10-06 RX ORDER — TIZANIDINE 2 MG/1
2 TABLET ORAL
Qty: 24 | Refills: 0 | Status: ACTIVE | COMMUNITY
Start: 2021-10-06 | End: 1900-01-01

## 2021-10-06 RX ORDER — METFORMIN HYDROCHLORIDE 625 MG/1
TABLET ORAL
Refills: 0 | Status: ACTIVE | COMMUNITY

## 2021-10-06 RX ORDER — PREDNISONE 10 MG/1
10 TABLET ORAL
Qty: 39 | Refills: 0 | Status: ACTIVE | COMMUNITY
Start: 2021-10-06 | End: 1900-01-01

## 2021-10-06 RX ORDER — ATORVASTATIN CALCIUM 80 MG/1
TABLET, FILM COATED ORAL
Refills: 0 | Status: ACTIVE | COMMUNITY

## 2021-10-06 NOTE — PHYSICAL EXAM
[de-identified] : Lumbar Physical Exam\par \par Gait -antalgic gait\par \par Station - Normal\par \par Sagittal balance - Normal\par \par Compensatory mechanism? - None\par \par Reflexes\par Patellar - normal\par Gastroc - normal\par Clonus - No\par \par Hip Exam - Normal\par \par Straight leg raise - none\par \par Pulses - 2+ dp/pt\par \par Range of motion - normal\par \par Sensation \par Sensation intact to light touch in L1, L2, L3, L4, L5 and S1 dermatomes bilaterally\par \par Motor\par 	IP	Quad	HS	TA	Gastroc	EHL\par Right	5/5	5/5	5/5	5/5	5/5	4/5\par Left	5/5	5/5	5/5	5/5	5/5	5/5 [de-identified] : Lumbar radiographs\par No instability on flexion-extension radiographs\par Facet arthropathy noted\par Disc height loss\par \par Lumbar MRI reviewed\par L4-L5 right-sided disc herniation

## 2021-10-06 NOTE — HISTORY OF PRESENT ILLNESS
[de-identified] : This is a 56-year-old female here today for evaluation of her right lower extremity pain.  She states that acutely the symptoms have been persistent for 2 weeks.  She has had intermittent symptoms for months.  This is somewhat different from the report that I received from her resident that examined her last week.  When I approached her about this discrepancy she stated that she stated her symptoms at her last visit.  Currently she states she is only dealing with 2 weeks of acute right lower extremity pain.  She has been taking Motrin and naproxen for pain relief.  She has tried physical therapy in the past which has helped her symptoms.  She denies any bowel bladder issues.  She denies any saddle anesthesia.  At this point she can walk 5+ blocks without significant pain.

## 2021-10-06 NOTE — ASSESSMENT
[FreeTextEntry1] : This is a 56-year-old female with a history of right lower extremity pain.  She has pain in her L5 radicular pattern and has an L4-L5 disc herniation on the right side.  This disc herniation is impinging on L5.  In my opinion the patient may be a candidate for surgery.  We discussed risks and benefits of surgical and nonsurgical intervention.  Please included possible issues with continued weakness and pain with nonsurgical intervention.  She wants to pursue conservative management at this time which I think is absolutely reasonable.  We will prescribe her a prednisone taper, spinal injection, tizanidine.  I did talk to her about the risks of continued NSAID use given the fact that she is taking both Motrin and naproxen.  The patient understood this and will stop the NSAIDs.  I will have her follow-up in 3 to 4 weeks for repeat clinical evaluation.  I did give her my direct contact information and encouraged her to reach out to me at any point if her symptoms worsen or change in any way.  Please note that over 40 minutes of time was spent in care of this patient which includes previsit preparation, in person visit, post visit documentation, discussion with colleagues.

## 2021-10-14 ENCOUNTER — RESULT REVIEW (OUTPATIENT)
Age: 56
End: 2021-10-14

## 2021-10-14 ENCOUNTER — OUTPATIENT (OUTPATIENT)
Dept: OUTPATIENT SERVICES | Facility: HOSPITAL | Age: 56
LOS: 1 days | End: 2021-10-14
Payer: MEDICAID

## 2021-10-14 ENCOUNTER — APPOINTMENT (OUTPATIENT)
Dept: RADIOLOGY | Facility: CLINIC | Age: 56
End: 2021-10-14
Payer: MEDICAID

## 2021-10-14 DIAGNOSIS — M54.16 RADICULOPATHY, LUMBAR REGION: ICD-10-CM

## 2021-10-14 DIAGNOSIS — Z00.8 ENCOUNTER FOR OTHER GENERAL EXAMINATION: ICD-10-CM

## 2021-10-14 DIAGNOSIS — M54.10 RADICULOPATHY, SITE UNSPECIFIED: ICD-10-CM

## 2021-10-14 DIAGNOSIS — M54.9 DORSALGIA, UNSPECIFIED: ICD-10-CM

## 2021-10-14 PROCEDURE — 62323 NJX INTERLAMINAR LMBR/SAC: CPT

## 2021-10-14 PROCEDURE — 72275: CPT

## 2021-11-01 PROCEDURE — G9005: CPT

## 2021-12-01 ENCOUNTER — OUTPATIENT (OUTPATIENT)
Dept: OUTPATIENT SERVICES | Facility: HOSPITAL | Age: 56
LOS: 1 days | End: 2021-12-01

## 2021-12-20 DIAGNOSIS — Z71.89 OTHER SPECIFIED COUNSELING: ICD-10-CM

## 2021-12-29 DIAGNOSIS — Z71.89 OTHER SPECIFIED COUNSELING: ICD-10-CM

## 2022-07-22 NOTE — ED ADULT TRIAGE NOTE - BP NONINVASIVE DIASTOLIC (MM HG)
90 Topical Sulfur Applications Counseling: Topical Sulfur Counseling: Patient counseled that this medication may cause skin irritation or allergic reactions.  In the event of skin irritation, the patient was advised to reduce the amount of the drug applied or use it less frequently.   The patient verbalized understanding of the proper use and possible adverse effects of topical sulfur application.  All of the patient's questions and concerns were addressed.

## 2023-04-10 ENCOUNTER — APPOINTMENT (OUTPATIENT)
Dept: RHEUMATOLOGY | Facility: CLINIC | Age: 58
End: 2023-04-10

## 2024-02-01 NOTE — ED PROCEDURE NOTE - ATTENDING CONTRIBUTION TO CARE
I (Portia) agree with above, I performed a history and physical. Counseled [facility] medical staff, physician assistant, and/or medical student on medical decision making as documented. Medical decisions and treatment interventions were made in real time during the patient encounter. Additionally and/or with the following exceptions: Calm

## 2024-05-10 NOTE — ED PROVIDER NOTE - NEUROLOGICAL POSTURING
F/U Regarding visit on:   4/11/2024       Caller states:  Concern:   Forgot to ask for motion/sea sickness patches   Going on a 1 week cruise June 6-17    Request:   Medication for motion sickness  Use: [id number] - [place], [place] - [address] - P [phone] - F [phone]         normal

## 2025-04-12 ENCOUNTER — NON-APPOINTMENT (OUTPATIENT)
Age: 60
End: 2025-04-12

## 2025-04-14 ENCOUNTER — APPOINTMENT (OUTPATIENT)
Dept: ORTHOPEDIC SURGERY | Facility: CLINIC | Age: 60
End: 2025-04-14
Payer: MEDICAID

## 2025-04-14 DIAGNOSIS — M77.01 MEDIAL EPICONDYLITIS, RIGHT ELBOW: ICD-10-CM

## 2025-04-14 DIAGNOSIS — I10 ESSENTIAL (PRIMARY) HYPERTENSION: ICD-10-CM

## 2025-04-14 DIAGNOSIS — Z00.00 ENCOUNTER FOR GENERAL ADULT MEDICAL EXAMINATION W/OUT ABNORMAL FINDINGS: ICD-10-CM

## 2025-04-14 PROCEDURE — 20551 NJX 1 TENDON ORIGIN/INSJ: CPT | Mod: RT

## 2025-04-14 PROCEDURE — 99203 OFFICE O/P NEW LOW 30 MIN: CPT | Mod: 25

## 2025-04-14 PROCEDURE — 73080 X-RAY EXAM OF ELBOW: CPT | Mod: RT

## 2025-07-28 ENCOUNTER — APPOINTMENT (OUTPATIENT)
Dept: ORTHOPEDIC SURGERY | Facility: CLINIC | Age: 60
End: 2025-07-28
Payer: COMMERCIAL

## 2025-07-28 DIAGNOSIS — M77.12 LATERAL EPICONDYLITIS, LEFT ELBOW: ICD-10-CM

## 2025-07-28 DIAGNOSIS — M77.11 LATERAL EPICONDYLITIS, RIGHT ELBOW: ICD-10-CM

## 2025-07-28 PROCEDURE — 99214 OFFICE O/P EST MOD 30 MIN: CPT

## 2025-07-28 PROCEDURE — 73080 X-RAY EXAM OF ELBOW: CPT | Mod: LT
